# Patient Record
Sex: FEMALE | Race: WHITE | NOT HISPANIC OR LATINO
[De-identification: names, ages, dates, MRNs, and addresses within clinical notes are randomized per-mention and may not be internally consistent; named-entity substitution may affect disease eponyms.]

---

## 2022-10-26 ENCOUNTER — DASHBOARD ENCOUNTERS (OUTPATIENT)
Age: 82
End: 2022-10-26

## 2022-10-26 ENCOUNTER — WEB ENCOUNTER (OUTPATIENT)
Dept: URBAN - METROPOLITAN AREA CLINIC 98 | Facility: CLINIC | Age: 82
End: 2022-10-26

## 2022-10-26 ENCOUNTER — OFFICE VISIT (OUTPATIENT)
Dept: URBAN - METROPOLITAN AREA CLINIC 98 | Facility: CLINIC | Age: 82
End: 2022-10-26
Payer: MEDICARE

## 2022-10-26 VITALS
WEIGHT: 198 LBS | HEART RATE: 93 BPM | HEIGHT: 64 IN | TEMPERATURE: 97.2 F | BODY MASS INDEX: 33.8 KG/M2 | DIASTOLIC BLOOD PRESSURE: 72 MMHG | SYSTOLIC BLOOD PRESSURE: 139 MMHG

## 2022-10-26 DIAGNOSIS — K21.9 GASTROESOPHAGEAL REFLUX DISEASE, UNSPECIFIED WHETHER ESOPHAGITIS PRESENT: ICD-10-CM

## 2022-10-26 DIAGNOSIS — R13.10 DYSPHAGIA, UNSPECIFIED TYPE: ICD-10-CM

## 2022-10-26 PROBLEM — 40739000: Status: ACTIVE | Noted: 2022-10-26

## 2022-10-26 PROBLEM — 235595009: Status: ACTIVE | Noted: 2022-10-26

## 2022-10-26 PROCEDURE — 99203 OFFICE O/P NEW LOW 30 MIN: CPT

## 2022-10-26 RX ORDER — SERTRALINE 50 MG/1
1 TABLET TABLET, FILM COATED ORAL ONCE A DAY
Status: ACTIVE | COMMUNITY

## 2022-10-26 RX ORDER — PREDNISONE 5 MG/1
1 TABLET TABLET ORAL ONCE A DAY
Status: ACTIVE | COMMUNITY

## 2022-10-26 RX ORDER — OMEPRAZOLE 40 MG/1
1 CAPSULE CAPSULE, DELAYED RELEASE ORAL TWICE A DAY
Qty: 60 | Refills: 3 | OUTPATIENT
Start: 2022-10-26

## 2022-10-26 RX ORDER — MEMANTINE HYDROCHLORIDE 10 MG/1
1 TABLET TABLET ORAL ONCE A DAY
Status: ACTIVE | COMMUNITY

## 2022-10-26 NOTE — HPI-TODAY'S VISIT:
Patient is a 82 year old female who presents for epigastric abdominal pain  Patient's daughter is speaking on her behalf as patient has "memory problems"  Has barium swallow study in 2018 in [place] because patient was experiencing epigastric discomfort Daughter state sit was recommended she proceed with EGD but patient "had fear of EGD"  Barium swallow study revealed "moderate reflux" per daughter  Has been on pantoprazole 40mg once a day since 2018 Initially once taking it, symptoms improved  Daughter states she has also been trying ot monitor mother's diet to help manage the reflux  Had an episode of epigastric pain that lasted for 6 hours last week. The pain did not resolved with Tums.  The pain reocurred 2 days later and went away after 6 hours  Daughter endorses patient has had difficulty swallowing for years Denies choking episodes but pills do cause a problem  BMs are described as daily  Denies BRBPR or melena  Denies unintetnional weight loss Denies fever or chills

## 2025-01-06 ENCOUNTER — TELEPHONE ENCOUNTER (OUTPATIENT)
Dept: URBAN - METROPOLITAN AREA CLINIC 63 | Facility: CLINIC | Age: 85
End: 2025-01-06

## 2025-01-06 ENCOUNTER — OFFICE VISIT (OUTPATIENT)
Dept: RURAL CLINIC 2 | Facility: CLINIC | Age: 85
End: 2025-01-06

## 2025-01-06 RX ORDER — MELOXICAM 15 MG/1
1 TABLET TABLET ORAL ONCE A DAY
Status: ACTIVE | COMMUNITY

## 2025-01-06 RX ORDER — PREDNISONE 5 MG/1
1 TABLET TABLET ORAL ONCE A DAY
Status: ACTIVE | COMMUNITY

## 2025-01-06 RX ORDER — DIVALPROEX SODIUM 500 MG/1
1 TABLET TABLET, DELAYED RELEASE ORAL TWICE A DAY
Status: ACTIVE | COMMUNITY

## 2025-01-06 RX ORDER — ONDANSETRON 4 MG/1
1 TABLET ON THE TONGUE AND ALLOW TO DISSOLVE TABLET, ORALLY DISINTEGRATING ORAL ONCE A DAY
Status: ACTIVE | COMMUNITY

## 2025-01-06 RX ORDER — SERTRALINE 100 MG/1
1 TABLET TABLET, FILM COATED ORAL ONCE A DAY
Status: ACTIVE | COMMUNITY

## 2025-01-06 RX ORDER — OMEPRAZOLE 40 MG/1
1 CAPSULE CAPSULE, DELAYED RELEASE ORAL TWICE A DAY
Qty: 60 | Refills: 3 | Status: ACTIVE | COMMUNITY
Start: 2022-10-26

## 2025-01-06 RX ORDER — DIAZEPAM 2 MG/1
1 TABLET AS NEEDED TABLET ORAL ONCE A DAY
Status: ACTIVE | COMMUNITY

## 2025-01-06 RX ORDER — MEMANTINE HYDROCHLORIDE 10 MG/1
1 TABLET TABLET ORAL ONCE A DAY
Status: ACTIVE | COMMUNITY

## 2025-01-13 ENCOUNTER — LAB OUTSIDE AN ENCOUNTER (OUTPATIENT)
Dept: RURAL CLINIC 2 | Facility: CLINIC | Age: 85
End: 2025-01-13

## 2025-01-13 ENCOUNTER — OFFICE VISIT (OUTPATIENT)
Dept: RURAL CLINIC 2 | Facility: CLINIC | Age: 85
End: 2025-01-13
Payer: MEDICARE

## 2025-01-13 VITALS
SYSTOLIC BLOOD PRESSURE: 116 MMHG | HEART RATE: 77 BPM | BODY MASS INDEX: 33.8 KG/M2 | WEIGHT: 198 LBS | HEIGHT: 64 IN | DIASTOLIC BLOOD PRESSURE: 73 MMHG

## 2025-01-13 DIAGNOSIS — R10.11 RIGHT UPPER QUADRANT PAIN: ICD-10-CM

## 2025-01-13 DIAGNOSIS — F03.B0 MODERATE DEMENTIA WITHOUT BEHAVIORAL DISTURBANCE, PSYCHOTIC DISTURBANCE, MOOD DISTURBANCE, OR ANXIETY, UNSPECIFIED DEMENTIA TYPE: ICD-10-CM

## 2025-01-13 DIAGNOSIS — K21.9 GERD WITHOUT ESOPHAGITIS: ICD-10-CM

## 2025-01-13 DIAGNOSIS — K80.20 GALLSTONES: ICD-10-CM

## 2025-01-13 DIAGNOSIS — K82.8 GALLBLADDER SLUDGE: ICD-10-CM

## 2025-01-13 DIAGNOSIS — M19.90 ARTHRITIS: ICD-10-CM

## 2025-01-13 PROBLEM — 266435005: Status: ACTIVE | Noted: 2025-01-13

## 2025-01-13 PROBLEM — 301717006: Status: ACTIVE | Noted: 2025-01-13

## 2025-01-13 PROBLEM — 235919008: Status: ACTIVE | Noted: 2025-01-13

## 2025-01-13 PROBLEM — 27123005: Status: ACTIVE | Noted: 2025-01-13

## 2025-01-13 PROBLEM — 3723001: Status: ACTIVE | Noted: 2025-01-13

## 2025-01-13 PROBLEM — 52448006: Status: ACTIVE | Noted: 2025-01-13

## 2025-01-13 PROCEDURE — 99203 OFFICE O/P NEW LOW 30 MIN: CPT | Performed by: NURSE PRACTITIONER

## 2025-01-13 RX ORDER — MELOXICAM 15 MG/1
1 TABLET TABLET ORAL ONCE A DAY
Status: ACTIVE | COMMUNITY

## 2025-01-13 RX ORDER — PREDNISONE 5 MG/1
1 TABLET TABLET ORAL ONCE A DAY
Status: ACTIVE | COMMUNITY

## 2025-01-13 RX ORDER — DIAZEPAM 2 MG/1
1 TABLET AS NEEDED TABLET ORAL ONCE A DAY
Status: ACTIVE | COMMUNITY

## 2025-01-13 RX ORDER — SERTRALINE 100 MG/1
1 TABLET TABLET, FILM COATED ORAL ONCE A DAY
Status: ACTIVE | COMMUNITY

## 2025-01-13 RX ORDER — OMEPRAZOLE 40 MG/1
1 CAPSULE 1/2 TO 1 HOUR BEFORE MORNING MEAL CAPSULE, DELAYED RELEASE ORAL ONCE A DAY
Refills: 3 | Status: ACTIVE | COMMUNITY
Start: 2022-10-26

## 2025-01-13 RX ORDER — MEMANTINE HYDROCHLORIDE 10 MG/1
1 TABLET TABLET ORAL ONCE A DAY
Status: ACTIVE | COMMUNITY

## 2025-01-13 RX ORDER — ONDANSETRON 4 MG/1
1 TABLET ON THE TONGUE AND ALLOW TO DISSOLVE TABLET, ORALLY DISINTEGRATING ORAL ONCE A DAY
Status: ACTIVE | COMMUNITY

## 2025-01-13 RX ORDER — DIVALPROEX SODIUM 500 MG/1
1 TABLET TABLET, DELAYED RELEASE ORAL TWICE A DAY
Status: ACTIVE | COMMUNITY

## 2025-01-13 NOTE — HPI-TODAY'S VISIT:
Patient is a 82 year old female who presents for epigastric abdominal pain  Patient's daughter is speaking on her behalf as patient has "memory problems"  Has barium swallow study in 2018 in [place] because patient was experiencing epigastric discomfort Daughter state sit was recommended she proceed with EGD but patient "had fear of EGD"  Barium swallow study revealed "moderate reflux" per daughter  Has been on pantoprazole 40mg once a day since 2018 Initially once taking it, symptoms improved  Daughter states she has also been trying ot monitor mother's diet to help manage the reflux  Had an episode of epigastric pain that lasted for 6 hours last week. The pain did not resolved with Tums.  The pain reocurred 2 days later and went away after 6 hours  Daughter endorses patient has had difficulty swallowing for years Denies choking episodesbut pills do cause a problem  BMs are described as daily  Denies BRBPR or melena  Denies unintetnional weight loss Denies fever or chills  01/13/2025 The pt is an 83 y/o female, accompanied by her spouse and daughter and presents with c/o RUQ tenderness associated with eating and sometimes without eating.  Her daughter is speaking for her secondary to memory loss/dementia. Recent Ct scan of the abdomen shows cholelithiasis without cholecystitis. Her PCP sent her for an abdominal u/s showing same gallstones as well as gallbladder sludge and negative for cholecystitis. Biliary ducts normal. She continues with RUQ pain with and wirthout eating and  offers no c/o acid reflux, heartburn, dysphagia, nausea or vomiting.